# Patient Record
Sex: FEMALE | Race: WHITE | NOT HISPANIC OR LATINO | Employment: FULL TIME | URBAN - METROPOLITAN AREA
[De-identification: names, ages, dates, MRNs, and addresses within clinical notes are randomized per-mention and may not be internally consistent; named-entity substitution may affect disease eponyms.]

---

## 2024-06-27 ENCOUNTER — HOSPITAL ENCOUNTER (OUTPATIENT)
Facility: MEDICAL CENTER | Age: 29
End: 2024-06-27
Attending: PHYSICIAN ASSISTANT
Payer: COMMERCIAL

## 2024-06-27 ENCOUNTER — OFFICE VISIT (OUTPATIENT)
Dept: URGENT CARE | Facility: CLINIC | Age: 29
End: 2024-06-27
Payer: COMMERCIAL

## 2024-06-27 VITALS
RESPIRATION RATE: 16 BRPM | HEART RATE: 80 BPM | DIASTOLIC BLOOD PRESSURE: 76 MMHG | TEMPERATURE: 97.8 F | OXYGEN SATURATION: 96 % | HEIGHT: 65 IN | WEIGHT: 175 LBS | BODY MASS INDEX: 29.16 KG/M2 | SYSTOLIC BLOOD PRESSURE: 122 MMHG

## 2024-06-27 DIAGNOSIS — L08.9 RIGHT FOOT INFECTION: ICD-10-CM

## 2024-06-27 PROCEDURE — 87205 SMEAR GRAM STAIN: CPT

## 2024-06-27 PROCEDURE — 3078F DIAST BP <80 MM HG: CPT | Performed by: PHYSICIAN ASSISTANT

## 2024-06-27 PROCEDURE — 87077 CULTURE AEROBIC IDENTIFY: CPT

## 2024-06-27 PROCEDURE — 99203 OFFICE O/P NEW LOW 30 MIN: CPT | Performed by: PHYSICIAN ASSISTANT

## 2024-06-27 PROCEDURE — 87070 CULTURE OTHR SPECIMN AEROBIC: CPT

## 2024-06-27 PROCEDURE — 87186 SC STD MICRODIL/AGAR DIL: CPT

## 2024-06-27 PROCEDURE — 3074F SYST BP LT 130 MM HG: CPT | Performed by: PHYSICIAN ASSISTANT

## 2024-06-27 RX ORDER — CEPHALEXIN 500 MG/1
500 CAPSULE ORAL 4 TIMES DAILY
Qty: 20 CAPSULE | Refills: 0 | Status: SHIPPED | OUTPATIENT
Start: 2024-06-27 | End: 2024-07-02

## 2024-06-27 ASSESSMENT — ENCOUNTER SYMPTOMS
CHILLS: 0
FEVER: 0

## 2024-06-27 NOTE — PROGRESS NOTES
"  Subjective:   Shante Kearney is a 28 y.o. female who presents today with   Chief Complaint   Patient presents with    Other     Patient coming for possilbe blister/cut infection due to a [place] trip. Both feet       Other  This is a new problem. The current episode started in the past 7 days. The problem occurs constantly. The problem has been unchanged. Pertinent negatives include no chills or fever.     Patient states she was recently [place] and was wearing sandals that cut into her feet when she was walking.  Noticed some discharge from the area.    PMH:  has no past medical history on file.  MEDS:   Current Outpatient Medications:     cephALEXin (KEFLEX) 500 MG Cap, Take 1 Capsule by mouth 4 times a day for 5 days., Disp: 20 Capsule, Rfl: 0  ALLERGIES: No Known Allergies  SURGHX: No past surgical history on file.  SOCHX:    FH: Reviewed with patient, not pertinent to this visit.       Review of Systems   Constitutional:  Negative for chills and fever.   Skin:         Abrasions to the feet        Objective:   /76   Pulse 80   Temp 36.6 °C (97.8 °F) (Temporal)   Resp 16   Ht 1.651 m (5' 5\")   Wt 79.4 kg (175 lb)   SpO2 96%   BMI 29.12 kg/m²   Physical Exam  Vitals and nursing note reviewed.   Constitutional:       General: She is not in acute distress.     Appearance: Normal appearance. She is well-developed. She is not ill-appearing or toxic-appearing.   HENT:      Head: Normocephalic and atraumatic.      Right Ear: Hearing normal.      Left Ear: Hearing normal.   Cardiovascular:      Rate and Rhythm: Normal rate.   Pulmonary:      Effort: Pulmonary effort is normal.   Musculoskeletal:      Comments: Full range of motion of the right foot.  Neurovascular intact distally.  Normal gait.  No bony tenderness to palpation bilaterally.    Skin:     General: Skin is warm and dry.             Comments: Larger abrasion with purulent drainage to the medial aspect.  No proximal or distal streaking otherwise.  " No induration or fluctuance.  Mild pus draining from the right foot.  No pus draining from the left foot but there are couple areas of abrasions with no induration or fluctuance.   Neurological:      Mental Status: She is alert.      Coordination: Coordination normal.   Psychiatric:         Mood and Affect: Mood normal.           Assessment/Plan:   Assessment    1. Right foot infection  - cephALEXin (KEFLEX) 500 MG Cap; Take 1 Capsule by mouth 4 times a day for 5 days.  Dispense: 20 Capsule; Refill: 0  - CULTURE WOUND W/ GRAM STAIN; Future      Symptoms and presentation appear consistent with abrasion with secondary superficial bacterial infection worse on the right foot than the left.  Would recommend continue to keep the area clean with bandages placed.  Would suggest continuing to use topical antibiotics to the area as well.    Offered tetanus shot today but patient declined.  We will follow-up with wound culture and adjust treatment accordingly if needed at that time.    Differential diagnosis, natural history, supportive care, and indications for immediate follow-up discussed.   Patient given instructions and understanding of medications and treatment.    If not improving in 3-5 days, F/U with PCP or return to  if symptoms worsen.    Patient agreeable to plan.      Please note that this dictation was created using voice recognition software. I have made every reasonable attempt to correct obvious errors, but I expect that there are errors of grammar and possibly content that I did not discover before finalizing the note.    Derick Enrique PA-C

## 2024-06-28 LAB
GRAM STN SPEC: NORMAL
SIGNIFICANT IND 70042: NORMAL
SITE SITE: NORMAL
SOURCE SOURCE: NORMAL

## 2024-06-30 LAB
BACTERIA WND AEROBE CULT: ABNORMAL
BACTERIA WND AEROBE CULT: ABNORMAL
GRAM STN SPEC: ABNORMAL
SIGNIFICANT IND 70042: ABNORMAL
SITE SITE: ABNORMAL
SOURCE SOURCE: ABNORMAL

## 2024-07-01 ENCOUNTER — TELEPHONE (OUTPATIENT)
Dept: URGENT CARE | Facility: CLINIC | Age: 29
End: 2024-07-01
Payer: COMMERCIAL

## 2024-10-13 ENCOUNTER — APPOINTMENT (OUTPATIENT)
Dept: RADIOLOGY | Facility: IMAGING CENTER | Age: 29
End: 2024-10-13
Attending: PHYSICIAN ASSISTANT
Payer: COMMERCIAL

## 2024-10-13 ENCOUNTER — OFFICE VISIT (OUTPATIENT)
Dept: URGENT CARE | Facility: CLINIC | Age: 29
End: 2024-10-13
Payer: COMMERCIAL

## 2024-10-13 VITALS
HEIGHT: 65 IN | TEMPERATURE: 97.6 F | RESPIRATION RATE: 19 BRPM | HEART RATE: 100 BPM | OXYGEN SATURATION: 98 % | DIASTOLIC BLOOD PRESSURE: 80 MMHG | SYSTOLIC BLOOD PRESSURE: 124 MMHG | BODY MASS INDEX: 28.56 KG/M2 | WEIGHT: 171.4 LBS

## 2024-10-13 DIAGNOSIS — S90.519A ABRASION OF ANKLE, UNSPECIFIED LATERALITY, INITIAL ENCOUNTER: ICD-10-CM

## 2024-10-13 DIAGNOSIS — M25.532 LEFT WRIST PAIN: ICD-10-CM

## 2024-10-13 PROCEDURE — 99213 OFFICE O/P EST LOW 20 MIN: CPT | Performed by: PHYSICIAN ASSISTANT

## 2024-10-13 PROCEDURE — 3079F DIAST BP 80-89 MM HG: CPT | Performed by: PHYSICIAN ASSISTANT

## 2024-10-13 PROCEDURE — 3074F SYST BP LT 130 MM HG: CPT | Performed by: PHYSICIAN ASSISTANT

## 2024-10-13 PROCEDURE — 73110 X-RAY EXAM OF WRIST: CPT | Mod: TC,FY,LT | Performed by: PHYSICIAN ASSISTANT

## 2024-10-13 RX ORDER — MUPIROCIN 20 MG/G
1 OINTMENT TOPICAL 2 TIMES DAILY
Qty: 15 G | Refills: 0 | Status: SHIPPED | OUTPATIENT
Start: 2024-10-13

## 2025-01-12 SDOH — ECONOMIC STABILITY: FOOD INSECURITY: WITHIN THE PAST 12 MONTHS, YOU WORRIED THAT YOUR FOOD WOULD RUN OUT BEFORE YOU GOT MONEY TO BUY MORE.: NEVER TRUE

## 2025-01-12 SDOH — ECONOMIC STABILITY: TRANSPORTATION INSECURITY
IN THE PAST 12 MONTHS, HAS THE LACK OF TRANSPORTATION KEPT YOU FROM MEDICAL APPOINTMENTS OR FROM GETTING MEDICATIONS?: NO

## 2025-01-12 SDOH — ECONOMIC STABILITY: HOUSING INSECURITY
IN THE LAST 12 MONTHS, WAS THERE A TIME WHEN YOU DID NOT HAVE A STEADY PLACE TO SLEEP OR SLEPT IN A SHELTER (INCLUDING NOW)?: NO

## 2025-01-12 SDOH — ECONOMIC STABILITY: TRANSPORTATION INSECURITY
IN THE PAST 12 MONTHS, HAS LACK OF TRANSPORTATION KEPT YOU FROM MEETINGS, WORK, OR FROM GETTING THINGS NEEDED FOR DAILY LIVING?: NO

## 2025-01-12 SDOH — ECONOMIC STABILITY: TRANSPORTATION INSECURITY
IN THE PAST 12 MONTHS, HAS LACK OF RELIABLE TRANSPORTATION KEPT YOU FROM MEDICAL APPOINTMENTS, MEETINGS, WORK OR FROM GETTING THINGS NEEDED FOR DAILY LIVING?: NO

## 2025-01-12 SDOH — ECONOMIC STABILITY: INCOME INSECURITY: IN THE LAST 12 MONTHS, WAS THERE A TIME WHEN YOU WERE NOT ABLE TO PAY THE MORTGAGE OR RENT ON TIME?: NO

## 2025-01-12 SDOH — ECONOMIC STABILITY: FOOD INSECURITY: WITHIN THE PAST 12 MONTHS, THE FOOD YOU BOUGHT JUST DIDN'T LAST AND YOU DIDN'T HAVE MONEY TO GET MORE.: NEVER TRUE

## 2025-01-12 SDOH — ECONOMIC STABILITY: INCOME INSECURITY: HOW HARD IS IT FOR YOU TO PAY FOR THE VERY BASICS LIKE FOOD, HOUSING, MEDICAL CARE, AND HEATING?: NOT HARD AT ALL

## 2025-01-12 SDOH — HEALTH STABILITY: PHYSICAL HEALTH: ON AVERAGE, HOW MANY MINUTES DO YOU ENGAGE IN EXERCISE AT THIS LEVEL?: 90 MIN

## 2025-01-12 SDOH — HEALTH STABILITY: PHYSICAL HEALTH: ON AVERAGE, HOW MANY DAYS PER WEEK DO YOU ENGAGE IN MODERATE TO STRENUOUS EXERCISE (LIKE A BRISK WALK)?: 3 DAYS

## 2025-01-12 SDOH — HEALTH STABILITY: MENTAL HEALTH
STRESS IS WHEN SOMEONE FEELS TENSE, NERVOUS, ANXIOUS, OR CAN'T SLEEP AT NIGHT BECAUSE THEIR MIND IS TROUBLED. HOW STRESSED ARE YOU?: RATHER MUCH

## 2025-01-12 ASSESSMENT — SOCIAL DETERMINANTS OF HEALTH (SDOH)
IN A TYPICAL WEEK, HOW MANY TIMES DO YOU TALK ON THE PHONE WITH FAMILY, FRIENDS, OR NEIGHBORS?: NEVER
HOW OFTEN DO YOU HAVE A DRINK CONTAINING ALCOHOL: 4 OR MORE TIMES A WEEK
ARE YOU MARRIED, WIDOWED, DIVORCED, SEPARATED, NEVER MARRIED, OR LIVING WITH A PARTNER?: NEVER MARRIED
HOW MANY DRINKS CONTAINING ALCOHOL DO YOU HAVE ON A TYPICAL DAY WHEN YOU ARE DRINKING: 5 OR 6
ARE YOU MARRIED, WIDOWED, DIVORCED, SEPARATED, NEVER MARRIED, OR LIVING WITH A PARTNER?: NEVER MARRIED
HOW HARD IS IT FOR YOU TO PAY FOR THE VERY BASICS LIKE FOOD, HOUSING, MEDICAL CARE, AND HEATING?: NOT HARD AT ALL
DO YOU BELONG TO ANY CLUBS OR ORGANIZATIONS SUCH AS CHURCH GROUPS UNIONS, FRATERNAL OR ATHLETIC GROUPS, OR SCHOOL GROUPS?: NO
IN THE PAST 12 MONTHS, HAS THE ELECTRIC, GAS, OIL, OR WATER COMPANY THREATENED TO SHUT OFF SERVICE IN YOUR HOME?: NO
HOW OFTEN DO YOU GET TOGETHER WITH FRIENDS OR RELATIVES?: NEVER
HOW OFTEN DO YOU ATTEND CHURCH OR RELIGIOUS SERVICES?: NEVER
HOW OFTEN DO YOU HAVE SIX OR MORE DRINKS ON ONE OCCASION: WEEKLY
HOW OFTEN DO YOU GET TOGETHER WITH FRIENDS OR RELATIVES?: NEVER
HOW OFTEN DO YOU ATTENT MEETINGS OF THE CLUB OR ORGANIZATION YOU BELONG TO?: NEVER
HOW OFTEN DO YOU ATTENT MEETINGS OF THE CLUB OR ORGANIZATION YOU BELONG TO?: NEVER
WITHIN THE PAST 12 MONTHS, YOU WORRIED THAT YOUR FOOD WOULD RUN OUT BEFORE YOU GOT THE MONEY TO BUY MORE: NEVER TRUE
IN A TYPICAL WEEK, HOW MANY TIMES DO YOU TALK ON THE PHONE WITH FAMILY, FRIENDS, OR NEIGHBORS?: NEVER
DO YOU BELONG TO ANY CLUBS OR ORGANIZATIONS SUCH AS CHURCH GROUPS UNIONS, FRATERNAL OR ATHLETIC GROUPS, OR SCHOOL GROUPS?: NO
HOW OFTEN DO YOU ATTEND CHURCH OR RELIGIOUS SERVICES?: NEVER

## 2025-01-12 ASSESSMENT — LIFESTYLE VARIABLES
HOW OFTEN DO YOU HAVE SIX OR MORE DRINKS ON ONE OCCASION: WEEKLY
HOW OFTEN DO YOU HAVE A DRINK CONTAINING ALCOHOL: 4 OR MORE TIMES A WEEK
SKIP TO QUESTIONS 9-10: 0
AUDIT-C TOTAL SCORE: 9
HOW MANY STANDARD DRINKS CONTAINING ALCOHOL DO YOU HAVE ON A TYPICAL DAY: 5 OR 6

## 2025-01-13 ENCOUNTER — OFFICE VISIT (OUTPATIENT)
Dept: MEDICAL GROUP | Facility: LAB | Age: 30
End: 2025-01-13
Payer: COMMERCIAL

## 2025-01-13 VITALS
BODY MASS INDEX: 29.57 KG/M2 | HEART RATE: 61 BPM | DIASTOLIC BLOOD PRESSURE: 64 MMHG | OXYGEN SATURATION: 97 % | TEMPERATURE: 97.2 F | WEIGHT: 177.47 LBS | SYSTOLIC BLOOD PRESSURE: 122 MMHG | RESPIRATION RATE: 14 BRPM | HEIGHT: 65 IN

## 2025-01-13 DIAGNOSIS — F34.1 PERSISTENT DEPRESSIVE DISORDER: ICD-10-CM

## 2025-01-13 DIAGNOSIS — Z13.29 SCREENING FOR THYROID DISORDER: ICD-10-CM

## 2025-01-13 DIAGNOSIS — Z11.59 NEED FOR HEPATITIS C SCREENING TEST: ICD-10-CM

## 2025-01-13 DIAGNOSIS — F41.9 ANXIETY: ICD-10-CM

## 2025-01-13 DIAGNOSIS — Z13.220 LIPID SCREENING: ICD-10-CM

## 2025-01-13 DIAGNOSIS — Z13.21 ENCOUNTER FOR VITAMIN DEFICIENCY SCREENING: ICD-10-CM

## 2025-01-13 DIAGNOSIS — Z11.4 ENCOUNTER FOR SCREENING FOR HIV: ICD-10-CM

## 2025-01-13 DIAGNOSIS — Z13.1 DIABETES MELLITUS SCREENING: ICD-10-CM

## 2025-01-13 DIAGNOSIS — F10.90 ALCOHOL USE DISORDER: ICD-10-CM

## 2025-01-13 DIAGNOSIS — E66.3 OVERWEIGHT (BMI 25.0-29.9): ICD-10-CM

## 2025-01-13 DIAGNOSIS — Z76.89 ENCOUNTER TO ESTABLISH CARE: ICD-10-CM

## 2025-01-13 PROCEDURE — 99214 OFFICE O/P EST MOD 30 MIN: CPT | Performed by: PHYSICIAN ASSISTANT

## 2025-01-13 PROCEDURE — 3074F SYST BP LT 130 MM HG: CPT | Performed by: PHYSICIAN ASSISTANT

## 2025-01-13 PROCEDURE — 3078F DIAST BP <80 MM HG: CPT | Performed by: PHYSICIAN ASSISTANT

## 2025-01-13 ASSESSMENT — ANXIETY QUESTIONNAIRES
5. BEING SO RESTLESS THAT IT IS HARD TO SIT STILL: SEVERAL DAYS
4. TROUBLE RELAXING: NEARLY EVERY DAY
7. FEELING AFRAID AS IF SOMETHING AWFUL MIGHT HAPPEN: NEARLY EVERY DAY
3. WORRYING TOO MUCH ABOUT DIFFERENT THINGS: MORE THAN HALF THE DAYS
IF YOU CHECKED OFF ANY PROBLEMS ON THIS QUESTIONNAIRE, HOW DIFFICULT HAVE THESE PROBLEMS MADE IT FOR YOU TO DO YOUR WORK, TAKE CARE OF THINGS AT HOME, OR GET ALONG WITH OTHER PEOPLE: VERY DIFFICULT
GAD7 TOTAL SCORE: 17
6. BECOMING EASILY ANNOYED OR IRRITABLE: NEARLY EVERY DAY
2. NOT BEING ABLE TO STOP OR CONTROL WORRYING: MORE THAN HALF THE DAYS
1. FEELING NERVOUS, ANXIOUS, OR ON EDGE: NEARLY EVERY DAY

## 2025-01-13 ASSESSMENT — PATIENT HEALTH QUESTIONNAIRE - PHQ9
CLINICAL INTERPRETATION OF PHQ2 SCORE: 5
SUM OF ALL RESPONSES TO PHQ QUESTIONS 1-9: 9
5. POOR APPETITE OR OVEREATING: 1 - SEVERAL DAYS

## 2025-01-13 NOTE — PROGRESS NOTES
Subjective:     CC:  There were no encounter diagnoses.    HISTORY OF THE PRESENT ILLNESS: Patient is a 29 y.o. female. This pleasant patient is here today to establish care   Verbal consent was acquired by the patient to use [employer] ambient listening note generation during this visit Yes     History of Present Illness  The patient presents to establish care and for evaluation of depression, alcohol use disorder, anxiety, and eating disorder.    #Depression, Anxiety  She has a lifelong history of depression, which she has been self-medicating with alcohol. She has not previously been on any psychiatric medications. She is interested in exploring medication options for her depression and alcohol use. She is also concerned about her liver health due to her alcohol consumption. She suspects she may have undiagnosed ADHD but is uncertain due to her long-term alcohol use and associated anxiety and cognitive issues. She recalls seeing an addiction counselor in college who focused solely on her alcohol use, despite her belief that her drinking was a symptom of other underlying issues. She has previously tried talk therapy without success.    She reports daily feelings of nervousness, anxiety, and restlessness, and finds it difficult to relax without the aid of marijuana or alcohol. She also experiences irritability and fear of impending disaster on a near-daily basis. These symptoms have significantly impacted her personal relationships, although she maintains her work performance.    #Alcohol Use Disorder  Her alcohol consumption has escalated recently, leading to significant problems. She consumes 4 to 6 White Claws during the week and 6 to 12 on weekends, a pattern that has persisted for approximately 3 years. She occasionally abstains from alcohol for a day, which she finds beneficial. She expresses a desire to reduce her alcohol intake but does not wish to abstain completely. A few years ago, she was drinking 1.5  to 2 bottles of wine a night.      She has a history of disordered eating, characterized by restriction, which exacerbates her alcoholism.    She has no known issues with blood sugar or blood pressure. She has no known allergies. She has no concerns about skin lesions. She feels safe at home. She has had a Pap smear or cervical cancer screening 4 years ago and has no history of abnormal Pap smears. She is not sexually active. She uses SPF for skin cancer prevention. She has not been screened for HIV or hepatitis C. She believes she received the chickenpox vaccine as a child and possibly the hepatitis B vaccine. She is unsure if she has received a tetanus shot in the past 10 years. She has not received the influenza vaccine this season. She has received COVID-19 vaccines and 1 booster dose. She has had wisdom teeth extraction. She recently had dental and eye exams after a 6-year gap and a general health check-up after a 5-year gap.    SOCIAL HISTORY  The patient admits to drinking 4-6 white claws on weekdays and 6-12 on weekends. The patient also admits to smoking marijuana daily. The patient goes to the gym for body classes 3 days a week.    FAMILY HISTORY  The patient's mother is suspected to be undiagnosed with depression, has rheumatoid arthritis, and hypertension. The patient's father is a heavy drinker but not to the degree of dependence. The patient's sister has hyperthyroidism. The patient's maternal grandfather  from Alzheimer's disease. The patient's paternal grandfather had a heart pacemaker put in and has undergone bypass surgery.    ALLERGIES  The patient has no known allergies.    IMMUNIZATIONS  The patient is pretty sure she had the chickenpox vaccine. The patient has received COVID-19 vaccines and 1 booster dose.        Health Maintenance     - Dyslipidemia (30-45): ordered  - Diabetes (HTN, HLD, BMI >25): ordered  - Depression screening (PHQ-2 and/or PHQ-9): see above  - Dental: UTD  - Eye:  "UTD  Diet: regular  Exercise: gym 3x per week  Substance Use: see above  Tobacco Use/counseling: denies  Safe in relationship: yes       Cancer screening  - Cervical CA (21-65): completed 4 years ago  -  HX Abnormal pap/HPV: none  - Skin cancer screening: uses SPF, hats     Infectious disease screening/Immunizations  --STI/HIV Screening: ordered  --not sexually active  --Hepatitis C Screening (18 to 80 yo): ordered  --Immunizations: declines today    Current Outpatient Medications Ordered in Epic   Medication Sig Dispense Refill    mupirocin (BACTROBAN) 2 % Ointment Apply 1 Application topically 2 times a day. 15 g 0     No current Epic-ordered facility-administered medications on file.       Health Maintenance: Completed    ROS:   Gen: no fevers/chills, no changes in weight  Eyes: no changes in vision  ENT: no sore throat, no hearing loss, no bloody nose  Pulm: no sob, no cough  CV: no chest pain, no palpitations  GI: no nausea/vomiting, no diarrhea  : no dysuria  MSk: no myalgias  Skin: no rash  Neuro: no headaches, no numbness/tingling  Heme/Lymph: no easy bruising      Objective:       Exam: /64 (BP Location: Left arm, Patient Position: Sitting, BP Cuff Size: Adult)   Pulse 61   Temp 36.2 °C (97.2 °F) (Temporal)   Resp 14   Ht 1.651 m (5' 5\")   Wt 80.5 kg (177 lb 7.5 oz)   SpO2 97%  Body mass index is 29.53 kg/m².    General: Normal appearing. No distress.  HEENT: Normocephalic. Eyes conjunctiva clear lids without ptosis, pupils equal and reactive to light accommodation, ears normal shape and contour, canals are clear bilaterally, tympanic membranes are benign, nasal mucosa benign, oropharynx is without erythema, edema or exudates.   Neck: Supple without JVD or bruit. Thyroid is not enlarged.  Pulmonary: Clear to ausculation.  Normal effort. No rales, ronchi, or wheezing.  Cardiovascular: Regular rate and rhythm without murmur. Carotid and radial pulses are intact and equal bilaterally.  Abdomen: " Soft, nontender, nondistended. Normal bowel sounds. Liver and spleen are not palpable  Neurologic: Grossly nonfocal  Lymph: No cervical or supraclavicular lymph nodes are palpable  Skin: Warm and dry.  No obvious lesions.  Musculoskeletal: Normal gait. No extremity cyanosis, clubbing, or edema.  Psych: Normal mood and affect. Alert and oriented x3. Judgment and insight is normal.      Assessment & Plan:   29 y.o. female with the following -    Assessment & Plan  1. Depression.  She has a lifelong history of depression, which she has been self-medicating with alcohol. She has not previously been on any psychiatric medications. She is interested in exploring medication options for her depression and alcohol use. She is also concerned about her liver health due to her alcohol consumption. She suspects she may have undiagnosed ADHD but is uncertain due to her long-term alcohol use and associated anxiety and cognitive issues. A referral to psychiatry has been initiated for further evaluation and management. She has been advised to abstain from alcohol while on this medication. If her lab results are within normal limits, a prescription for Wellbutrin will be considered.    2. Alcohol Use Disorder.  She reports consuming 4-6 drinks on weekdays and 6-12 drinks on weekends consistently for the past 3 years. She is interested in reducing her alcohol intake but does not want to quit entirely. A comprehensive lab workup has been ordered to assess liver function, kidney function, electrolytes, cholesterol, thyroid, blood sugar, iron, vitamin D, B12, folic acid, HIV, and hepatitis C. She has been advised to fast for at least 8 hours prior to the lab work and to ensure adequate hydration. If her lab results are within normal limits, a prescription for Wellbutrin will be considered.    3. Anxiety.  She reports experiencing anxiety symptoms nearly every day, including nervousness, inability to control worrying, trouble relaxing,  restlessness, irritability, and fear of something awful happening. A referral to psychiatry has been initiated for further evaluation and management.    4. Eating Disorder.  She reports a history of disordered eating characterized by restriction, which exacerbates her alcoholism. This information will be included in the referral to psychiatry for further evaluation and management.    5. Health Maintenance.  She has been advised to schedule a nurse visit for the Tdap vaccine when she is ready. She has declined the influenza vaccine for this season. She has received COVID-19 vaccines and 1 booster dose. A comprehensive lab workup has been ordered to assess liver function, kidney function, electrolytes, cholesterol, thyroid, blood sugar, iron, vitamin D, B12, folic acid, HIV, and hepatitis C. She has been advised to fast for at least 8 hours prior to the lab work and to ensure adequate hydration.    Follow-up  The patient will follow up in a couple of weeks to review the lab results.    PROCEDURE  The patient has had wisdom teeth extraction.    1. Encounter to establish care        2. Lipid screening  CBC WITH DIFFERENTIAL    Comp Metabolic Panel    Lipid Profile      3. Screening for thyroid disorder  TSH WITH REFLEX TO FT4      4. Diabetes mellitus screening  HEMOGLOBIN A1C      5. Overweight (BMI 25.0-29.9)        6. Alcohol use disorder  Referral to Behavioral Health    VIT B12,  FOLIC ACID    IRON/TOTAL IRON BIND    VITAMIN D,25 HYDROXY (DEFICIENCY)      7. Persistent depressive disorder  Referral to Behavioral Health      8. Anxiety  Referral to Behavioral Health      9. Need for hepatitis C screening test  HEP C VIRUS ANTIBODY      10. Encounter for screening for HIV  HIV AG/AB COMBO ASSAY SCREENING      11. Encounter for vitamin deficiency screening  VIT B12,  FOLIC ACID    IRON/TOTAL IRON BIND    VITAMIN D,25 HYDROXY (DEFICIENCY)            I spent a total of 20 minutes with record review, exam, communication  with the patient, communication with other providers, and documentation of this encounter.    No follow-ups on file.    Please note that this dictation was created using voice recognition software. I have made every reasonable attempt to correct obvious errors, but I expect that there are errors of grammar and possibly content that I did not discover before finalizing the note.

## 2025-01-15 ENCOUNTER — HOSPITAL ENCOUNTER (OUTPATIENT)
Dept: LAB | Facility: MEDICAL CENTER | Age: 30
End: 2025-01-15
Attending: PHYSICIAN ASSISTANT
Payer: COMMERCIAL

## 2025-01-15 DIAGNOSIS — Z11.4 ENCOUNTER FOR SCREENING FOR HIV: ICD-10-CM

## 2025-01-15 DIAGNOSIS — F10.90 ALCOHOL USE DISORDER: ICD-10-CM

## 2025-01-15 DIAGNOSIS — Z13.220 LIPID SCREENING: ICD-10-CM

## 2025-01-15 DIAGNOSIS — Z13.29 SCREENING FOR THYROID DISORDER: ICD-10-CM

## 2025-01-15 DIAGNOSIS — Z13.1 DIABETES MELLITUS SCREENING: ICD-10-CM

## 2025-01-15 DIAGNOSIS — Z11.59 NEED FOR HEPATITIS C SCREENING TEST: ICD-10-CM

## 2025-01-15 DIAGNOSIS — Z13.21 ENCOUNTER FOR VITAMIN DEFICIENCY SCREENING: ICD-10-CM

## 2025-01-15 LAB
25(OH)D3 SERPL-MCNC: 17 NG/ML (ref 30–100)
ALBUMIN SERPL BCP-MCNC: 4.6 G/DL (ref 3.2–4.9)
ALBUMIN/GLOB SERPL: 1.8 G/DL
ALP SERPL-CCNC: 55 U/L (ref 30–99)
ALT SERPL-CCNC: 18 U/L (ref 2–50)
ANION GAP SERPL CALC-SCNC: 14 MMOL/L (ref 7–16)
AST SERPL-CCNC: 20 U/L (ref 12–45)
BASOPHILS # BLD AUTO: 0.7 % (ref 0–1.8)
BASOPHILS # BLD: 0.05 K/UL (ref 0–0.12)
BILIRUB SERPL-MCNC: 0.2 MG/DL (ref 0.1–1.5)
BUN SERPL-MCNC: 8 MG/DL (ref 8–22)
CALCIUM ALBUM COR SERPL-MCNC: 9 MG/DL (ref 8.5–10.5)
CALCIUM SERPL-MCNC: 9.5 MG/DL (ref 8.5–10.5)
CHLORIDE SERPL-SCNC: 99 MMOL/L (ref 96–112)
CHOLEST SERPL-MCNC: 193 MG/DL (ref 100–199)
CO2 SERPL-SCNC: 23 MMOL/L (ref 20–33)
CREAT SERPL-MCNC: 0.61 MG/DL (ref 0.5–1.4)
EOSINOPHIL # BLD AUTO: 0.13 K/UL (ref 0–0.51)
EOSINOPHIL NFR BLD: 1.8 % (ref 0–6.9)
ERYTHROCYTE [DISTWIDTH] IN BLOOD BY AUTOMATED COUNT: 51.5 FL (ref 35.9–50)
EST. AVERAGE GLUCOSE BLD GHB EST-MCNC: 91 MG/DL
FOLATE SERPL-MCNC: 6.6 NG/ML
GFR SERPLBLD CREATININE-BSD FMLA CKD-EPI: 124 ML/MIN/1.73 M 2
GLOBULIN SER CALC-MCNC: 2.6 G/DL (ref 1.9–3.5)
GLUCOSE SERPL-MCNC: 86 MG/DL (ref 65–99)
HBA1C MFR BLD: 4.8 % (ref 4–5.6)
HCT VFR BLD AUTO: 41.3 % (ref 37–47)
HCV AB SER QL: NORMAL
HDLC SERPL-MCNC: 77 MG/DL
HGB BLD-MCNC: 13.5 G/DL (ref 12–16)
HIV 1+2 AB+HIV1 P24 AG SERPL QL IA: NORMAL
IMM GRANULOCYTES # BLD AUTO: 0.02 K/UL (ref 0–0.11)
IMM GRANULOCYTES NFR BLD AUTO: 0.3 % (ref 0–0.9)
IRON SATN MFR SERPL: 19 % (ref 15–55)
IRON SERPL-MCNC: 62 UG/DL (ref 40–170)
LDLC SERPL CALC-MCNC: 105 MG/DL
LYMPHOCYTES # BLD AUTO: 1.75 K/UL (ref 1–4.8)
LYMPHOCYTES NFR BLD: 24 % (ref 22–41)
MCH RBC QN AUTO: 33.3 PG (ref 27–33)
MCHC RBC AUTO-ENTMCNC: 32.7 G/DL (ref 32.2–35.5)
MCV RBC AUTO: 101.7 FL (ref 81.4–97.8)
MONOCYTES # BLD AUTO: 0.67 K/UL (ref 0–0.85)
MONOCYTES NFR BLD AUTO: 9.2 % (ref 0–13.4)
NEUTROPHILS # BLD AUTO: 4.68 K/UL (ref 1.82–7.42)
NEUTROPHILS NFR BLD: 64 % (ref 44–72)
NRBC # BLD AUTO: 0 K/UL
NRBC BLD-RTO: 0 /100 WBC (ref 0–0.2)
PLATELET # BLD AUTO: 353 K/UL (ref 164–446)
PMV BLD AUTO: 10.4 FL (ref 9–12.9)
POTASSIUM SERPL-SCNC: 4.4 MMOL/L (ref 3.6–5.5)
PROT SERPL-MCNC: 7.2 G/DL (ref 6–8.2)
RBC # BLD AUTO: 4.06 M/UL (ref 4.2–5.4)
SODIUM SERPL-SCNC: 136 MMOL/L (ref 135–145)
TIBC SERPL-MCNC: 332 UG/DL (ref 250–450)
TRIGL SERPL-MCNC: 56 MG/DL (ref 0–149)
TSH SERPL DL<=0.005 MIU/L-ACNC: 1.76 UIU/ML (ref 0.38–5.33)
UIBC SERPL-MCNC: 270 UG/DL (ref 110–370)
VIT B12 SERPL-MCNC: 437 PG/ML (ref 211–911)
WBC # BLD AUTO: 7.3 K/UL (ref 4.8–10.8)

## 2025-01-15 PROCEDURE — 85025 COMPLETE CBC W/AUTO DIFF WBC: CPT

## 2025-01-15 PROCEDURE — 80061 LIPID PANEL: CPT

## 2025-01-15 PROCEDURE — 83036 HEMOGLOBIN GLYCOSYLATED A1C: CPT

## 2025-01-15 PROCEDURE — 86803 HEPATITIS C AB TEST: CPT

## 2025-01-15 PROCEDURE — 82746 ASSAY OF FOLIC ACID SERUM: CPT

## 2025-01-15 PROCEDURE — 36415 COLL VENOUS BLD VENIPUNCTURE: CPT

## 2025-01-15 PROCEDURE — 83540 ASSAY OF IRON: CPT

## 2025-01-15 PROCEDURE — 83550 IRON BINDING TEST: CPT

## 2025-01-15 PROCEDURE — 80053 COMPREHEN METABOLIC PANEL: CPT

## 2025-01-15 PROCEDURE — 82607 VITAMIN B-12: CPT

## 2025-01-15 PROCEDURE — 84443 ASSAY THYROID STIM HORMONE: CPT

## 2025-01-15 PROCEDURE — 87389 HIV-1 AG W/HIV-1&-2 AB AG IA: CPT

## 2025-01-15 PROCEDURE — 82306 VITAMIN D 25 HYDROXY: CPT

## 2025-04-01 ENCOUNTER — APPOINTMENT (OUTPATIENT)
Dept: BEHAVIORAL HEALTH | Facility: CLINIC | Age: 30
End: 2025-04-01
Payer: COMMERCIAL

## 2025-04-01 DIAGNOSIS — F90.2 ATTENTION DEFICIT HYPERACTIVITY DISORDER, COMBINED TYPE: ICD-10-CM

## 2025-04-01 DIAGNOSIS — F41.1 GENERALIZED ANXIETY DISORDER: ICD-10-CM

## 2025-04-01 DIAGNOSIS — F33.1 MAJOR DEPRESSIVE DISORDER, RECURRENT EPISODE, MODERATE (HCC): ICD-10-CM

## 2025-04-01 PROCEDURE — 90792 PSYCH DIAG EVAL W/MED SRVCS: CPT | Performed by: PSYCHIATRY & NEUROLOGY

## 2025-04-01 RX ORDER — DEXTROAMPHETAMINE SACCHARATE, AMPHETAMINE ASPARTATE, DEXTROAMPHETAMINE SULFATE AND AMPHETAMINE SULFATE 1.25; 1.25; 1.25; 1.25 MG/1; MG/1; MG/1; MG/1
5 TABLET ORAL 3 TIMES DAILY
Qty: 90 TABLET | Refills: 0 | Status: SHIPPED | OUTPATIENT
Start: 2025-04-01 | End: 2025-04-22

## 2025-04-01 NOTE — PROGRESS NOTES
[facility]   Therapy Progress Note      This provider informed the patient their medical records are totally confidential except for the use by other providers involved in their care, or if the patient signs a release, or to report instances of child or elder abuse, or if it is determined they are an immediate risk to harm themselves or others.    Name: Shante Kearney  MRN: [id number]  : 1995  Age: 29 y.o.  Date of assessment: 2025  PCP: Ynes Burleson P.A.-C.      Present Illness:   Chart reviewed prior to seeing her in my office.  She is 29, never , and has no children.  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  She moved to [place] from [place] 2 years ago.  She is seeking treatment of anxiety, depression, ADHD and possible autism.   Most concerned about her ADHD symptoms.  She is restless at times and is distractible but not impulsive.  She has difficulty completing projects and difficulty staying focused on movies unless they are particularly interesting.  She is able to read novels.  We discussed nonstimulant and stimulant treatments for ADHD.   She would prefer to start Adderall 5 mg 3 times daily.  Her symptoms of depression include fluctuating appetite and difficulty with short-term memory and concentration.  At times she has felt useless, worthless, and hopeless.  She does promise to go to the emergency room if she were to feel actively suicidal.  She has never developed a specific suicidal plan.  Anxiety is a difficult problem and she has used alcohol to calm herself.  She is using marijuana 7 nights per week.    Past Psych History:   No previous psychiatric hospitalization and no previous psychiatric treatment.    Substance Abuse History:  See present illness.  Additional information will need to be obtained.    Family History:   Father has an alcohol abuse problem.  She has 1 brother and sister but they have not had psychiatric treatment.  A maternal  grandmother has had anxiety problems.  Her mother has had problems with depression but has never been diagnosed or treated.    Medical History:  No significant medical problems    Psych Medications:  No previous psychiatric medication    Allergies:   Seasonal allergies    Mental Status:   She is alert, oriented, and cooperative.  Relatedness is good.  Grooming is good.  Speech is normal rate.  Anxious.  Memory is good.  Insight and judgment are good no indication of psychotic thinking.    Current Risk:       Suicidal:        Homicidal:        Self-Harm:       Relapse (Low/Moderate/High):       Crisis Safety Plan Reviewed:    Diagnosis:  ADHD  Major Depressive disorder, recurrent  Generalized anxiety disorder  Rule out autism    Treatment Plan:  The current treatment plan consists of a follow-up visit in 2 weeks designed to evaluate her ADHD, depression, and anxiety.    Duration will be for a minimum of 12 months and will be reviewed at each visit.    Goals: Improvement in ADHD symptoms with remission of depression and control of anxiety in order to prevent relapse due to the chronic nature of her behavioral health problems and mental illness.  Start Adderall 5 mg 3 times daily.  Possible side effects discussed.      Bebo Mensah M.D.     This note was created using voice recognition software (Dragon). The accuracy of the dictation is limited by the abilities of the software. I have reviewed the note prior to signing, however some errors in grammar and context are still possible. If you have any questions related to this note please do not hesitate to contact our office.

## 2025-04-17 ENCOUNTER — APPOINTMENT (OUTPATIENT)
Dept: BEHAVIORAL HEALTH | Facility: CLINIC | Age: 30
End: 2025-04-17
Payer: COMMERCIAL

## 2025-04-22 ENCOUNTER — OFFICE VISIT (OUTPATIENT)
Dept: BEHAVIORAL HEALTH | Facility: CLINIC | Age: 30
End: 2025-04-22
Payer: COMMERCIAL

## 2025-04-22 DIAGNOSIS — F90.2 ATTENTION DEFICIT HYPERACTIVITY DISORDER, COMBINED TYPE: ICD-10-CM

## 2025-04-22 PROCEDURE — 99214 OFFICE O/P EST MOD 30 MIN: CPT | Performed by: PSYCHIATRY & NEUROLOGY

## 2025-04-22 RX ORDER — DEXTROAMPHETAMINE SACCHARATE, AMPHETAMINE ASPARTATE, DEXTROAMPHETAMINE SULFATE AND AMPHETAMINE SULFATE 2.5; 2.5; 2.5; 2.5 MG/1; MG/1; MG/1; MG/1
10 TABLET ORAL 3 TIMES DAILY
Qty: 90 TABLET | Refills: 0 | Status: SHIPPED | OUTPATIENT
Start: 2025-04-22 | End: 2025-05-22

## 2025-04-22 NOTE — PROGRESS NOTES
[facility]   Follow Up Assessment     This provider informed the patient their medical records are totally confidential except for the use by other providers involved in their care, or if the patient signs a release, or to report instances of child or elder abuse, or if it is determined they are an immediate risk to harm themselves or others.    Name: Shante Kearney  MRN: [id number]  : 1995  Age: 29 y.o.  Date of assessment: 2025  PCP: Ynes Burleson P.A.-C.      Subjective:  Chart reviewed prior to seeing her in my office.  She was seen most recently on .  She no side effects on Adderall 5 mg 3 times daily and would like to increase the dosage to 10 3 times daily.  He has no side effects so far.  Psychological testing for autism is apparently not available here.    Objective:  She is alert, oriented, and cooperative.  Relatedness is good.  Grooming is good.  Speech is normal rate.  Affect is flattened.  Memory is good.  Insight and judgment are good.  No indication of psychotic thinking.    Current Risk:       Suicidal: Not suicidal       Homicidal: Not homicidal       Self-Harm: No plan to harm self       Relapse: (Low/Moderate/High): Moderate       Crisis Safety Plan Reviewed: Discussed with patient    Diagnosis:   ADHD  Major depressive disorder, recurrent  Generalized anxiety disorder  Rule out autism    Treatment Plan:  The current treatment plan consists of a follow-up visit in 1 month designed to evaluate her ADHD, depression, and anxiety.    Duration will be for a minimum of 12 months and will be reviewed at each visit.    Goals: Improvement in ADHD symptoms in order to prevent relapse due to the chronic nature of her behavioral health problems and mental illness.    Duration will be for a minimum of 12 months and will be reviewed at each visit.    Goals: Improvement in ADHD symptoms in order to prevent relapse due to the chronic nature of her behavioral health  problems and mental illness.  Increase Adderall to 10 3 times daily.    Bebo Mensah M.D.      This note was created using voice recognition software (Dragon). The accuracy of the dictation is limited by the abilities of the software. I have reviewed the note prior to signing, however some errors in grammar and context are still possible. If you have any questions related to this note please do not hesitate to contact our office.

## 2025-05-22 ENCOUNTER — OFFICE VISIT (OUTPATIENT)
Dept: BEHAVIORAL HEALTH | Facility: CLINIC | Age: 30
End: 2025-05-22
Payer: COMMERCIAL

## 2025-05-22 DIAGNOSIS — F33.1 MAJOR DEPRESSIVE DISORDER, RECURRENT EPISODE, MODERATE (HCC): ICD-10-CM

## 2025-05-22 DIAGNOSIS — F41.1 GENERALIZED ANXIETY DISORDER: ICD-10-CM

## 2025-05-22 DIAGNOSIS — F90.2 ATTENTION DEFICIT HYPERACTIVITY DISORDER, COMBINED TYPE: Primary | ICD-10-CM

## 2025-05-22 PROCEDURE — 99214 OFFICE O/P EST MOD 30 MIN: CPT | Performed by: PSYCHIATRY & NEUROLOGY

## 2025-05-22 RX ORDER — DEXTROAMPHETAMINE SACCHARATE, AMPHETAMINE ASPARTATE, DEXTROAMPHETAMINE SULFATE AND AMPHETAMINE SULFATE 3.75; 3.75; 3.75; 3.75 MG/1; MG/1; MG/1; MG/1
15 TABLET ORAL 3 TIMES DAILY
Qty: 90 TABLET | Refills: 0 | Status: SHIPPED | OUTPATIENT
Start: 2025-05-22 | End: 2025-06-21

## 2025-05-22 NOTE — PROGRESS NOTES
[facility]   Follow Up Assessment     This provider informed the patient their medical records are totally confidential except for the use by other providers involved in their care, or if the patient signs a release, or to report instances of child or elder abuse, or if it is determined they are an immediate risk to harm themselves or others.    Name: Shante Kearney  MRN: [id number]  : 1995  Age: 29 y.o.  Date of assessment: 2025  PCP: Ynes Burleson P.A.-C.      Subjective:  Chart reviewed prior to seeing her in my office.  She was last seen on .  Adderall 10 mg 3 times daily is helping her ADHD but she would like to increase to 15 mg 3 times daily.  She asked about treatment for depression and anxiety.  She will research Celexa which could be started when her ADHD symptoms are stabilized or possibly sooner.    Objective:  She is, oriented, and cooperative.  Relatedness is good.  Grooming is good.  Speech is normal rate.  Anxious.  Memory is good.  Insight and judgment are good.  No indication of psychotic thinking.    Current Risk:       Suicidal: Not suicidal       Homicidal: Not homicidal       Self-Harm: No plan to harm self       Relapse: (Low/Moderate/High): Moderate       Crisis Safety Plan Reviewed:    Diagnosis:   ADHD  Major depressive disorder, recurrent  Generalized anxiety disorder  Rule out autism    Treatment Plan:  The current treatment plan consists of monthly psychiatric sessions designed to evaluate her ADHD, depression, and anxiety.    Duration will be for a minimum of 12 months and will be reviewed at each visit.    Goals: Improvement in ADHD symptoms with remission of depression and control of anxiety due to the chronic nature of her behavioral health problems and mental illness.  Increase Adderall to 15 mg 3 times daily.  She will research Celexa.        Bebo Mensah M.D.      This note was created using voice recognition software (Dragon). The  accuracy of the dictation is limited by the abilities of the software. I have reviewed the note prior to signing, however some errors in grammar and context are still possible. If you have any questions related to this note please do not hesitate to contact our office.

## 2025-06-24 ENCOUNTER — OFFICE VISIT (OUTPATIENT)
Dept: BEHAVIORAL HEALTH | Facility: CLINIC | Age: 30
End: 2025-06-24
Payer: COMMERCIAL

## 2025-06-24 DIAGNOSIS — F41.1 GENERALIZED ANXIETY DISORDER: ICD-10-CM

## 2025-06-24 DIAGNOSIS — F90.2 ATTENTION DEFICIT HYPERACTIVITY DISORDER, COMBINED TYPE: ICD-10-CM

## 2025-06-24 DIAGNOSIS — F33.1 MAJOR DEPRESSIVE DISORDER, RECURRENT EPISODE, MODERATE (HCC): Primary | ICD-10-CM

## 2025-06-24 PROCEDURE — 99214 OFFICE O/P EST MOD 30 MIN: CPT | Performed by: PSYCHIATRY & NEUROLOGY

## 2025-06-24 RX ORDER — CITALOPRAM HYDROBROMIDE 10 MG/1
10 TABLET ORAL DAILY
Qty: 30 TABLET | Refills: 0 | Status: SHIPPED | OUTPATIENT
Start: 2025-06-24 | End: 2025-07-24

## 2025-06-24 RX ORDER — DEXTROAMPHETAMINE SACCHARATE, AMPHETAMINE ASPARTATE, DEXTROAMPHETAMINE SULFATE AND AMPHETAMINE SULFATE 3.75; 3.75; 3.75; 3.75 MG/1; MG/1; MG/1; MG/1
15 TABLET ORAL 3 TIMES DAILY
Qty: 90 TABLET | Refills: 0 | Status: SHIPPED | OUTPATIENT
Start: 2025-06-24 | End: 2025-07-24

## 2025-06-24 NOTE — PROGRESS NOTES
[facility]   Follow Up Assessment     This provider informed the patient their medical records are totally confidential except for the use by other providers involved in their care, or if the patient signs a release, or to report instances of child or elder abuse, or if it is determined they are an immediate risk to harm themselves or others.    Name: Shante Kearney  MRN: [id number]  : 1995  Age: 29 y.o.  Date of assessment: 2025  PCP: Ynes Burleson P.A.-C.      Subjective:  Chart reviewed prior to seeing her in my office.  She was seen most recently on May 22.  Adderall 15 mg 3 times daily is working well for her ADHD.  She did research Celexa and is agreeable to starting 10 mg at bedtime.  Currently there is no testing available here for autism.    Objective:  She is alert, oriented, and cooperative.  Relatedness is good.  Grooming is good.  Speech is normal rate.  Anxious.  Memory is good.  Insight and judgment are good.  No indication of psychotic thinking.    Diagnosis:  Major depressive disorder, recurrent  Generalized anxiety disorder  ADHD  Rule out autism.    Treatment Plan:  The current treatment plan consists of a follow-up visit in 1 month and then quarterly psychiatric sessions designed to evaluate her major depressive disorder, recurrent, generalized anxiety disorder, and ADHD.    Duration will be for a minimum of 12 months and will be reviewed at each visit.    Goals: Remission of depression and control of anxiety with improvement in ADHD symptoms in order to prevent relapse due to the chronic nature of her behavioral health problems and mental illness.  Continue Adderall 15 mg 3 times daily.  Start Celexa 10 mg at bedtime.  Possible side effects including drowsiness discussed.    Bebo Mensah M.D.      This note was created using voice recognition software (Dragon). The accuracy of the dictation is limited by the abilities of the software. I have reviewed the  note prior to signing, however some errors in grammar and context are still possible. If you have any questions related to this note please do not hesitate to contact our office.

## 2025-07-24 ENCOUNTER — OFFICE VISIT (OUTPATIENT)
Dept: BEHAVIORAL HEALTH | Facility: CLINIC | Age: 30
End: 2025-07-24
Payer: COMMERCIAL

## 2025-07-24 DIAGNOSIS — F41.1 GENERALIZED ANXIETY DISORDER: ICD-10-CM

## 2025-07-24 DIAGNOSIS — F90.2 ATTENTION DEFICIT HYPERACTIVITY DISORDER, COMBINED TYPE: ICD-10-CM

## 2025-07-24 DIAGNOSIS — F33.1 MAJOR DEPRESSIVE DISORDER, RECURRENT EPISODE, MODERATE (HCC): Primary | ICD-10-CM

## 2025-07-24 PROCEDURE — 99214 OFFICE O/P EST MOD 30 MIN: CPT | Performed by: PSYCHIATRY & NEUROLOGY

## 2025-07-24 RX ORDER — CITALOPRAM HYDROBROMIDE 10 MG/1
10 TABLET ORAL NIGHTLY
Qty: 30 TABLET | Refills: 0 | Status: SHIPPED | OUTPATIENT
Start: 2025-07-24 | End: 2025-08-23

## 2025-07-24 RX ORDER — DEXTROAMPHETAMINE SACCHARATE, AMPHETAMINE ASPARTATE, DEXTROAMPHETAMINE SULFATE AND AMPHETAMINE SULFATE 3.75; 3.75; 3.75; 3.75 MG/1; MG/1; MG/1; MG/1
15 TABLET ORAL 3 TIMES DAILY
Qty: 90 TABLET | Refills: 0 | Status: SHIPPED | OUTPATIENT
Start: 2025-07-24 | End: 2025-08-23

## 2025-07-24 NOTE — PROGRESS NOTES
[facility]   Follow Up Assessment     This provider informed the patient their medical records are totally confidential except for the use by other providers involved in their care, or if the patient signs a release, or to report instances of child or elder abuse, or if it is determined they are an immediate risk to harm themselves or others.    Name: Shante Kearney  MRN: [id number]  : 1995  Age: 30 y.o.  Date of assessment: 2025  PCP: Ynes Burleson P.A.-C.      Subjective:  Chart reviewed prior to seeing her in my office.  She was last seen on .  We reviewed her current medication combination, purposes, doses, etc.  Adderall 15 mg 3 times daily is helping her ADHD symptoms.  She prefers to continue Celexa 10 mg at bedtime for now.    Objective:  He is alert, oriented, and cooperative.  Relatedness is good.  Grooming is good.  Speech is normal rate.  Anxious.  Memory is good.  Insight and judgment are good.  No indication of psychotic thinking.    Current Risk:       Suicidal: Not suicidal       Homicidal: Not homicidal       Self-Harm: No plan to harm self       Relapse: (Low/Moderate/High): Moderate       Crisis Safety Plan Reviewed: Discussed with patient    Diagnosis:   Major depressive disorder, recurrent  Generalized anxiety disorder  ADHD  Rule out autism    Treatment Plan:  Treatment plan consists of a follow-up visit in 2 months and then quarterly psychiatric sessions designed to evaluate her recurrent depression, anxiety, and ADHD.    Duration will be for a minimum of 12 months and will be reviewed at each visit.    Goals: Remission of depression with control of anxiety and improvement in ADHD symptoms in order to prevent relapse due to the chronic nature of her behavioral health problems and mental illness.  She prefers to continue Celexa 10 mg at bedtime for now.  Continue Adderall 15 mg 3 times daily.    Bebo Mensah M.D.      This note was created using  voice recognition software (Dragon). The accuracy of the dictation is limited by the abilities of the software. I have reviewed the note prior to signing, however some errors in grammar and context are still possible. If you have any questions related to this note please do not hesitate to contact our office.

## 2025-08-18 RX ORDER — CITALOPRAM HYDROBROMIDE 10 MG/1
10 TABLET ORAL EVERY EVENING
Qty: 90 TABLET | Refills: 1 | Status: SHIPPED | OUTPATIENT
Start: 2025-08-18